# Patient Record
Sex: MALE | Race: WHITE | NOT HISPANIC OR LATINO | Employment: FULL TIME | URBAN - METROPOLITAN AREA
[De-identification: names, ages, dates, MRNs, and addresses within clinical notes are randomized per-mention and may not be internally consistent; named-entity substitution may affect disease eponyms.]

---

## 2024-10-02 ENCOUNTER — HOSPITAL ENCOUNTER (EMERGENCY)
Facility: HOSPITAL | Age: 49
Discharge: HOME | End: 2024-10-02
Payer: COMMERCIAL

## 2024-10-02 VITALS
RESPIRATION RATE: 16 BRPM | DIASTOLIC BLOOD PRESSURE: 82 MMHG | HEART RATE: 89 BPM | WEIGHT: 315 LBS | OXYGEN SATURATION: 98 % | HEIGHT: 71 IN | SYSTOLIC BLOOD PRESSURE: 146 MMHG | BODY MASS INDEX: 44.1 KG/M2 | TEMPERATURE: 97.9 F

## 2024-10-02 DIAGNOSIS — R73.9 HYPERGLYCEMIA: Primary | ICD-10-CM

## 2024-10-02 LAB — GLUCOSE BLD MANUAL STRIP-MCNC: 295 MG/DL (ref 74–99)

## 2024-10-02 PROCEDURE — 82947 ASSAY GLUCOSE BLOOD QUANT: CPT

## 2024-10-02 PROCEDURE — 99283 EMERGENCY DEPT VISIT LOW MDM: CPT

## 2024-10-02 ASSESSMENT — LIFESTYLE VARIABLES
EVER FELT BAD OR GUILTY ABOUT YOUR DRINKING: NO
TOTAL SCORE: 0
HAVE PEOPLE ANNOYED YOU BY CRITICIZING YOUR DRINKING: NO
HAVE YOU EVER FELT YOU SHOULD CUT DOWN ON YOUR DRINKING: NO
EVER HAD A DRINK FIRST THING IN THE MORNING TO STEADY YOUR NERVES TO GET RID OF A HANGOVER: NO

## 2024-10-02 ASSESSMENT — PAIN - FUNCTIONAL ASSESSMENT: PAIN_FUNCTIONAL_ASSESSMENT: 0-10

## 2024-10-02 ASSESSMENT — PAIN SCALES - GENERAL: PAINLEVEL_OUTOF10: 0 - NO PAIN

## 2024-10-02 NOTE — ED TRIAGE NOTES
"\"Started Metformin and Insulin yesterday. Checked my blood sugar today and it's still high, I was told to come here if it's high\" per pt. BS . Pt having no sx, no complaints.   "

## 2024-10-02 NOTE — ED PROVIDER NOTES
HPI   Chief Complaint   Patient presents with    Hyperglycemia       HPI  See my MDM      Patient History   Past Medical History:   Diagnosis Date    Other conditions influencing health status 04/06/2016    No significant past medical history     Past Surgical History:   Procedure Laterality Date    OTHER SURGICAL HISTORY  04/06/2016    Dental Surgery     No family history on file.  Social History     Tobacco Use    Smoking status: Not on file    Smokeless tobacco: Not on file   Substance Use Topics    Alcohol use: Not on file    Drug use: Not on file       Physical Exam   ED Triage Vitals [10/02/24 1620]   Temperature Heart Rate Respirations BP   36.6 °C (97.9 °F) 89 16 146/82      Pulse Ox Temp Source Heart Rate Source Patient Position   98 % Oral Radial Sitting      BP Location FiO2 (%)     Left arm --       Physical Exam  CONSTITUTIONAL: Vital signs reviewed as charted, well-developed and in no distress  Eyes: Extraocular muscles are intact. Pupils equal round and reactive to light. Conjunctiva are pink.    ENT: Mucous membranes are moist. Tongue in the midline. Pharynx was without erythema or exudates, uvula midline  LUNGS: Breath sounds equal and clear to auscultation. Good air exchange, no wheezes rales or retractions, pulse oximetry is charted.  HEART: Regular rate and rhythm without murmur thrill or rub, strong tones, auscultation is normal.  ABDOMEN: Soft and nontender without guarding rebound rigidity or mass. Bowel sounds are present and normal in all quadrants. There is no palpable masses or aneurysms identified. No hepatosplenomegaly, normal abdominal exam.  Neuro: The patient is awake, alert and oriented ×3. Moving all 4 extremities and answering questions appropriately.   MUSCULOSKELETAL: The calves are nontender to palpation. Full gross active range of motion.   PSYCH: Awake alert oriented, normal mood and affect.  Skin:  Dry, normal color, warm to the touch, no rash present.        ED Course & MDM    Diagnoses as of 10/02/24 1640   Hyperglycemia                 No data recorded     [place] Coma Scale Score: 15 (10/02/24 1628 : Shalini Bernal RN)                           Medical Decision Making  History obtained from: patient    Vital signs, nursing notes, current medications, past medical history, Surgical history, allergies, social history, family History were reviewed.         HPI:  Patient is a 48-year-old male presenting emergency room today for evaluation of hyperglycemia.  States he reestablished with a primary care physician and had blood work done 2 days ago was noted to be a diabetic.  Hemoglobin A1c was 12.9 with an average glucose of 324.  His doctor started him yesterday on metformin 500 mg and 14 units of long-acting insulin at nighttime.  His doctor sent him home with glucose monitoring strips and told him if the glucose in his urine was greater than purple in color he should go to the ER.  Patient states his blood sugars still been around 300 at home and he peed on the strip and is greater than purple so he came in.  Denies nausea vomiting diarrhea.  Denies cough or congestion.  States he did call his doctor they told up his metformin to 1000 mg daily.      10 point ROS was reviewed and negative except Noted above in HPI.  DDX: as listed above          MDM Summary/considerations:  Labs Reviewed   POCT GLUCOSE - Abnormal       Result Value    POCT Glucose 295 (*)      No orders to display     Medications - No data to display  New Prescriptions    No medications on file     I had a very long discussion with the patient and his partner monitoring glucose before meals for [place] in the morning and before bedtime.  Offered laboratory assessment which is declined at this point.  They will continue treatment monitor blood glucose and speak with her PCP.  Was discharged home in stable condition.    All of the patient's questions were answered to the best of my ability.  Patient states understanding that  they have been screened for an emergency today and we have not found any etiology of symptoms that requires emergent treatment or admission to the hospital at this point. They understand that they have not had definitive care day and require follow-up for treatment of their condition. They also state understanding that they may have an emergent condition that may potentially have not of detected at this visit and they must return to the emergency department if they develop any worsening of symptoms or new complaints.    I have evaluated this patient, my supervising physician was available for consultation.                Critical Care: Not warranted at this time        This chart was completed using voice recognition transcription software. Please excuse any errors of transcription including grammatical, punctuation, syntax and spelling errors.  Please contact me with any questions regarding this chart.    Procedure  Procedures     ETHAN Taylor-LEO  10/02/24 1640

## 2025-05-29 ENCOUNTER — APPOINTMENT (OUTPATIENT)
Dept: PRIMARY CARE | Facility: CLINIC | Age: 50
End: 2025-05-29
Payer: COMMERCIAL

## 2025-05-29 VITALS — BODY MASS INDEX: 44.1 KG/M2 | HEIGHT: 71 IN | WEIGHT: 315 LBS

## 2025-05-29 DIAGNOSIS — E78.5 DYSLIPIDEMIA: ICD-10-CM

## 2025-05-29 DIAGNOSIS — Z13.220 LIPID SCREENING: ICD-10-CM

## 2025-05-29 DIAGNOSIS — E11.65 UNCONTROLLED TYPE 2 DIABETES MELLITUS WITH HYPERGLYCEMIA: Primary | ICD-10-CM

## 2025-05-29 DIAGNOSIS — Z13.5 DIABETIC RETINOPATHY SCREENING: ICD-10-CM

## 2025-05-29 PROBLEM — F41.9 ANXIETY: Status: ACTIVE | Noted: 2025-05-29

## 2025-05-29 PROBLEM — L60.0 INGROWN NAIL OF GREAT TOE: Status: ACTIVE | Noted: 2025-05-29

## 2025-05-29 PROBLEM — N50.89 TESTIS MASS: Status: ACTIVE | Noted: 2025-05-29

## 2025-05-29 PROBLEM — M77.51 CAPSULITIS OF TOE OF RIGHT FOOT: Status: ACTIVE | Noted: 2025-05-29

## 2025-05-29 PROCEDURE — 4010F ACE/ARB THERAPY RXD/TAKEN: CPT | Performed by: INTERNAL MEDICINE

## 2025-05-29 PROCEDURE — 3008F BODY MASS INDEX DOCD: CPT | Performed by: INTERNAL MEDICINE

## 2025-05-29 PROCEDURE — 99204 OFFICE O/P NEW MOD 45 MIN: CPT | Performed by: INTERNAL MEDICINE

## 2025-05-29 RX ORDER — LANCETS 33 GAUGE
EACH MISCELLANEOUS
COMMUNITY
Start: 2024-10-01 | End: 2025-05-30 | Stop reason: SDUPTHER

## 2025-05-29 RX ORDER — LANCETS 30 GAUGE
EACH MISCELLANEOUS
COMMUNITY
Start: 2024-10-01

## 2025-05-29 RX ORDER — ROSUVASTATIN CALCIUM 10 MG/1
1 TABLET, COATED ORAL NIGHTLY
COMMUNITY
Start: 2024-12-26 | End: 2025-05-30 | Stop reason: SDUPTHER

## 2025-05-29 RX ORDER — LISINOPRIL 5 MG/1
5 TABLET ORAL
COMMUNITY
Start: 2024-12-26 | End: 2025-05-30 | Stop reason: SDUPTHER

## 2025-05-29 RX ORDER — ESCITALOPRAM OXALATE 10 MG/1
10 TABLET ORAL
COMMUNITY
Start: 2025-04-04 | End: 2025-05-30 | Stop reason: SDUPTHER

## 2025-05-29 RX ORDER — INSULIN GLARGINE 100 [IU]/ML
14 INJECTION, SOLUTION SUBCUTANEOUS
COMMUNITY
Start: 2024-10-01 | End: 2025-05-30 | Stop reason: SDUPTHER

## 2025-05-29 RX ORDER — METFORMIN HYDROCHLORIDE 500 MG/1
1000 TABLET, EXTENDED RELEASE ORAL 2 TIMES DAILY
COMMUNITY
Start: 2024-10-01

## 2025-05-30 DIAGNOSIS — I10 PRIMARY HYPERTENSION: ICD-10-CM

## 2025-05-30 DIAGNOSIS — F41.9 ANXIETY: ICD-10-CM

## 2025-05-30 DIAGNOSIS — E13.9 DIABETES MELLITUS OF OTHER TYPE WITHOUT COMPLICATION, UNSPECIFIED WHETHER LONG TERM INSULIN USE: ICD-10-CM

## 2025-05-30 DIAGNOSIS — E78.00 HYPERCHOLESTEROLEMIA: ICD-10-CM

## 2025-05-30 RX ORDER — METFORMIN HYDROCHLORIDE 500 MG/1
500 TABLET ORAL
Qty: 180 TABLET | Refills: 0 | Status: SHIPPED | OUTPATIENT
Start: 2025-05-30

## 2025-05-30 RX ORDER — ROSUVASTATIN CALCIUM 10 MG/1
10 TABLET, COATED ORAL NIGHTLY
Qty: 90 TABLET | Refills: 0 | Status: CANCELLED | OUTPATIENT
Start: 2025-05-30

## 2025-05-30 RX ORDER — LANCETS 33 GAUGE
EACH MISCELLANEOUS
Qty: 200 EACH | Refills: 5 | Status: SHIPPED | OUTPATIENT
Start: 2025-05-30

## 2025-05-30 RX ORDER — LISINOPRIL 5 MG/1
5 TABLET ORAL
Qty: 90 TABLET | Refills: 0 | Status: SHIPPED | OUTPATIENT
Start: 2025-05-30

## 2025-05-30 RX ORDER — ROSUVASTATIN CALCIUM 10 MG/1
10 TABLET, COATED ORAL NIGHTLY
Qty: 90 TABLET | Refills: 0 | Status: SHIPPED | OUTPATIENT
Start: 2025-05-30

## 2025-05-30 RX ORDER — ESCITALOPRAM OXALATE 10 MG/1
10 TABLET ORAL
Qty: 90 TABLET | Refills: 0 | Status: SHIPPED | OUTPATIENT
Start: 2025-05-30

## 2025-05-30 RX ORDER — METFORMIN HYDROCHLORIDE 500 MG/1
1000 TABLET, EXTENDED RELEASE ORAL
Qty: 360 TABLET | Refills: 0 | Status: CANCELLED | OUTPATIENT
Start: 2025-05-30

## 2025-05-30 RX ORDER — INSULIN GLARGINE 100 [IU]/ML
14 INJECTION, SOLUTION SUBCUTANEOUS NIGHTLY
Qty: 12.6 ML | Refills: 0 | Status: SHIPPED | OUTPATIENT
Start: 2025-05-30

## 2025-05-30 RX ORDER — LISINOPRIL 5 MG/1
5 TABLET ORAL
Qty: 90 TABLET | Refills: 0 | Status: CANCELLED | OUTPATIENT
Start: 2025-05-30

## 2025-05-30 RX ORDER — ESCITALOPRAM OXALATE 10 MG/1
10 TABLET ORAL
Qty: 90 TABLET | Refills: 0 | Status: CANCELLED | OUTPATIENT
Start: 2025-05-30

## 2025-05-31 DIAGNOSIS — I10 HYPERTENSION, UNSPECIFIED TYPE: ICD-10-CM

## 2025-06-12 ENCOUNTER — APPOINTMENT (OUTPATIENT)
Dept: PRIMARY CARE | Facility: CLINIC | Age: 50
End: 2025-06-12
Payer: COMMERCIAL

## 2025-06-12 VITALS
DIASTOLIC BLOOD PRESSURE: 82 MMHG | HEIGHT: 71 IN | SYSTOLIC BLOOD PRESSURE: 144 MMHG | WEIGHT: 315 LBS | BODY MASS INDEX: 44.1 KG/M2

## 2025-06-12 DIAGNOSIS — L84 FOOT CALLUS: ICD-10-CM

## 2025-06-12 DIAGNOSIS — Z01.00 ENCOUNTER FOR EYE EXAM IN PATIENT WITH TYPE 2 DIABETES MELLITUS: ICD-10-CM

## 2025-06-12 DIAGNOSIS — E11.9 ENCOUNTER FOR EYE EXAM IN PATIENT WITH TYPE 2 DIABETES MELLITUS: ICD-10-CM

## 2025-06-12 DIAGNOSIS — E11.65 UNCONTROLLED TYPE 2 DIABETES MELLITUS WITH HYPERGLYCEMIA: ICD-10-CM

## 2025-06-12 DIAGNOSIS — I10 PRIMARY HYPERTENSION: ICD-10-CM

## 2025-06-12 PROCEDURE — 3077F SYST BP >= 140 MM HG: CPT | Performed by: INTERNAL MEDICINE

## 2025-06-12 PROCEDURE — 4010F ACE/ARB THERAPY RXD/TAKEN: CPT | Performed by: INTERNAL MEDICINE

## 2025-06-12 PROCEDURE — 99214 OFFICE O/P EST MOD 30 MIN: CPT | Performed by: INTERNAL MEDICINE

## 2025-06-12 PROCEDURE — 3079F DIAST BP 80-89 MM HG: CPT | Performed by: INTERNAL MEDICINE

## 2025-06-12 PROCEDURE — 3008F BODY MASS INDEX DOCD: CPT | Performed by: INTERNAL MEDICINE

## 2025-06-12 RX ORDER — LISINOPRIL 10 MG/1
10 TABLET ORAL DAILY
Qty: 30 TABLET | Refills: 2 | Status: SHIPPED | OUTPATIENT
Start: 2025-06-12 | End: 2026-07-17

## 2025-06-12 NOTE — PROGRESS NOTES
"Subjective   Patient ID: Juancho Sanderson is a 49 y.o. male who presents for Follow-up.    HPI   Patient is here for follow-up on blood work  Follow-up on hypertension diabetes  Patient has not had a eye exam for a while  Also complaining of having calluses on the bottom of the feet    Past recap  New patient visit  Follow-up on diabetes and high cholesterol  Needs medication refill  Patient has not been taking care of himself  He has uncontrolled diabetes uncontrolled cholesterol  He is now seriously planning to change his lifestyle    Past medical history: Hypertension, diabetes, depression, anxiety, bilateral inguinal hernia surgery, tonsillectomy, hydrocele repair as a baby  Occupation: Manager at [employer]  Social history: Quit smoking 14 months ago, social alcohol use no drugs, patient  no kids  Family history: Mother breast cancer, adopted Sister leukemia  Review of Systems    Objective   /82   Ht 1.803 m (5' 11\")   Wt (!) 185 kg (407 lb)   BMI 56.76 kg/m²     Physical Exam  Vitals reviewed.   Constitutional:       Appearance: Normal appearance.   HENT:      Head: Normocephalic and atraumatic.      Right Ear: Tympanic membrane, ear canal and external ear normal.      Left Ear: Tympanic membrane, ear canal and external ear normal.      Nose: Nose normal.      Mouth/Throat:      Pharynx: Oropharynx is clear.   Eyes:      Extraocular Movements: Extraocular movements intact.      Conjunctiva/sclera: Conjunctivae normal.      Pupils: Pupils are equal, round, and reactive to light.   Cardiovascular:      Rate and Rhythm: Normal rate and regular rhythm.      Pulses: Normal pulses.      Heart sounds: Normal heart sounds.   Pulmonary:      Effort: Pulmonary effort is normal.      Breath sounds: Normal breath sounds.   Abdominal:      General: Abdomen is flat. Bowel sounds are normal.      Palpations: Abdomen is soft.   Musculoskeletal:      Cervical back: Normal range of motion and neck supple.   Skin:     " General: Skin is warm and dry.   Neurological:      General: No focal deficit present.      Mental Status: He is alert and oriented to person, place, and time.   Psychiatric:         Mood and Affect: Mood normal.         Assessment/Plan   Problem List Items Addressed This Visit    None  Visit Diagnoses         Codes      Uncontrolled type 2 diabetes mellitus with hyperglycemia     E11.65    Relevant Medications    semaglutide 0.25 mg or 0.5 mg (2 mg/3 mL) pen injector      BMI 50.0-59.9, adult (Multi)     Z68.43    Relevant Medications    semaglutide 0.25 mg or 0.5 mg (2 mg/3 mL) pen injector      Primary hypertension     I10    Relevant Medications    lisinopril 10 mg tablet      Foot callus     L84    Relevant Orders    Referral to Podiatry      Encounter for eye exam in patient with type 2 diabetes mellitus     Z01.00, E11.9    Relevant Orders    Referral to Ophthalmology          Past recap  Patient's chart reviewed  Last blood work reviewed  A1c 12.9  Discussed diet exercise losing weight  Blood work ordered  EKG done shows normal sinus rhythm  Medications refilled for 90 days    6/12/2025  Patient did not do blood work  Blood pressure still high  lisinopril 10 mg a day  Ozempic to help with uncontrolled diabetes  Refer to ophthalmology  Refer to podiatry for foot callus  Discussed diet and exercise  Follow-up in a month